# Patient Record
Sex: FEMALE | Race: ASIAN | NOT HISPANIC OR LATINO | URBAN - METROPOLITAN AREA
[De-identification: names, ages, dates, MRNs, and addresses within clinical notes are randomized per-mention and may not be internally consistent; named-entity substitution may affect disease eponyms.]

---

## 2023-02-26 ENCOUNTER — EMERGENCY (EMERGENCY)
Facility: HOSPITAL | Age: 33
LOS: 1 days | Discharge: HOME | End: 2023-02-26
Attending: EMERGENCY MEDICINE | Admitting: EMERGENCY MEDICINE
Payer: MEDICAID

## 2023-02-26 VITALS
TEMPERATURE: 98 F | SYSTOLIC BLOOD PRESSURE: 161 MMHG | HEART RATE: 90 BPM | DIASTOLIC BLOOD PRESSURE: 103 MMHG | RESPIRATION RATE: 18 BRPM | OXYGEN SATURATION: 98 %

## 2023-02-26 LAB
ALBUMIN SERPL ELPH-MCNC: 4.6 G/DL — SIGNIFICANT CHANGE UP (ref 3.3–5)
ALP SERPL-CCNC: 78 U/L — SIGNIFICANT CHANGE UP (ref 40–120)
ALT FLD-CCNC: 16 U/L — SIGNIFICANT CHANGE UP (ref 4–33)
ANION GAP SERPL CALC-SCNC: 13 MMOL/L — SIGNIFICANT CHANGE UP (ref 7–14)
APPEARANCE UR: ABNORMAL
AST SERPL-CCNC: 12 U/L — SIGNIFICANT CHANGE UP (ref 4–32)
BASOPHILS # BLD AUTO: 0.03 K/UL — SIGNIFICANT CHANGE UP (ref 0–0.2)
BASOPHILS NFR BLD AUTO: 0.4 % — SIGNIFICANT CHANGE UP (ref 0–2)
BILIRUB SERPL-MCNC: 0.4 MG/DL — SIGNIFICANT CHANGE UP (ref 0.2–1.2)
BILIRUB UR-MCNC: NEGATIVE — SIGNIFICANT CHANGE UP
BUN SERPL-MCNC: 6 MG/DL — LOW (ref 7–23)
CALCIUM SERPL-MCNC: 9.8 MG/DL — SIGNIFICANT CHANGE UP (ref 8.4–10.5)
CHLORIDE SERPL-SCNC: 103 MMOL/L — SIGNIFICANT CHANGE UP (ref 98–107)
CO2 SERPL-SCNC: 23 MMOL/L — SIGNIFICANT CHANGE UP (ref 22–31)
COLOR SPEC: COLORLESS — SIGNIFICANT CHANGE UP
CREAT SERPL-MCNC: 0.43 MG/DL — LOW (ref 0.5–1.3)
D DIMER BLD IA.RAPID-MCNC: <150 NG/ML DDU — SIGNIFICANT CHANGE UP
DIFF PNL FLD: ABNORMAL
EGFR: 133 ML/MIN/1.73M2 — SIGNIFICANT CHANGE UP
EOSINOPHIL # BLD AUTO: 0.03 K/UL — SIGNIFICANT CHANGE UP (ref 0–0.5)
EOSINOPHIL NFR BLD AUTO: 0.4 % — SIGNIFICANT CHANGE UP (ref 0–6)
FLUAV AG NPH QL: SIGNIFICANT CHANGE UP
FLUBV AG NPH QL: SIGNIFICANT CHANGE UP
GLUCOSE SERPL-MCNC: 130 MG/DL — HIGH (ref 70–99)
GLUCOSE UR QL: NEGATIVE — SIGNIFICANT CHANGE UP
HCT VFR BLD CALC: 36.1 % — SIGNIFICANT CHANGE UP (ref 34.5–45)
HGB BLD-MCNC: 11.4 G/DL — LOW (ref 11.5–15.5)
IANC: 5.8 K/UL — SIGNIFICANT CHANGE UP (ref 1.8–7.4)
IMM GRANULOCYTES NFR BLD AUTO: 0.4 % — SIGNIFICANT CHANGE UP (ref 0–0.9)
KETONES UR-MCNC: NEGATIVE — SIGNIFICANT CHANGE UP
LEUKOCYTE ESTERASE UR-ACNC: NEGATIVE — SIGNIFICANT CHANGE UP
LIDOCAIN IGE QN: 15 U/L — SIGNIFICANT CHANGE UP (ref 7–60)
LYMPHOCYTES # BLD AUTO: 2.14 K/UL — SIGNIFICANT CHANGE UP (ref 1–3.3)
LYMPHOCYTES # BLD AUTO: 25.4 % — SIGNIFICANT CHANGE UP (ref 13–44)
MAGNESIUM SERPL-MCNC: 1.7 MG/DL — SIGNIFICANT CHANGE UP (ref 1.6–2.6)
MCHC RBC-ENTMCNC: 25.7 PG — LOW (ref 27–34)
MCHC RBC-ENTMCNC: 31.6 GM/DL — LOW (ref 32–36)
MCV RBC AUTO: 81.3 FL — SIGNIFICANT CHANGE UP (ref 80–100)
MONOCYTES # BLD AUTO: 0.38 K/UL — SIGNIFICANT CHANGE UP (ref 0–0.9)
MONOCYTES NFR BLD AUTO: 4.5 % — SIGNIFICANT CHANGE UP (ref 2–14)
NEUTROPHILS # BLD AUTO: 5.8 K/UL — SIGNIFICANT CHANGE UP (ref 1.8–7.4)
NEUTROPHILS NFR BLD AUTO: 68.9 % — SIGNIFICANT CHANGE UP (ref 43–77)
NITRITE UR-MCNC: NEGATIVE — SIGNIFICANT CHANGE UP
NRBC # BLD: 0 /100 WBCS — SIGNIFICANT CHANGE UP (ref 0–0)
NRBC # FLD: 0 K/UL — SIGNIFICANT CHANGE UP (ref 0–0)
NT-PROBNP SERPL-SCNC: <5 PG/ML — SIGNIFICANT CHANGE UP
PH UR: 6.5 — SIGNIFICANT CHANGE UP (ref 5–8)
PLATELET # BLD AUTO: 401 K/UL — HIGH (ref 150–400)
POTASSIUM SERPL-MCNC: 3.9 MMOL/L — SIGNIFICANT CHANGE UP (ref 3.5–5.3)
POTASSIUM SERPL-SCNC: 3.9 MMOL/L — SIGNIFICANT CHANGE UP (ref 3.5–5.3)
PROT SERPL-MCNC: 8.2 G/DL — SIGNIFICANT CHANGE UP (ref 6–8.3)
PROT UR-MCNC: ABNORMAL
RBC # BLD: 4.44 M/UL — SIGNIFICANT CHANGE UP (ref 3.8–5.2)
RBC # FLD: 13.3 % — SIGNIFICANT CHANGE UP (ref 10.3–14.5)
RBC CASTS # UR COMP ASSIST: SIGNIFICANT CHANGE UP /HPF (ref 0–4)
RSV RNA NPH QL NAA+NON-PROBE: SIGNIFICANT CHANGE UP
SARS-COV-2 RNA SPEC QL NAA+PROBE: SIGNIFICANT CHANGE UP
SODIUM SERPL-SCNC: 139 MMOL/L — SIGNIFICANT CHANGE UP (ref 135–145)
SP GR SPEC: 1 — LOW (ref 1.01–1.05)
TROPONIN T, HIGH SENSITIVITY RESULT: <6 NG/L — SIGNIFICANT CHANGE UP
TSH SERPL-MCNC: 1.28 UIU/ML — SIGNIFICANT CHANGE UP (ref 0.27–4.2)
UROBILINOGEN FLD QL: SIGNIFICANT CHANGE UP
WBC # BLD: 8.41 K/UL — SIGNIFICANT CHANGE UP (ref 3.8–10.5)
WBC # FLD AUTO: 8.41 K/UL — SIGNIFICANT CHANGE UP (ref 3.8–10.5)
WBC UR QL: 2 /HPF — SIGNIFICANT CHANGE UP (ref 0–5)

## 2023-02-26 PROCEDURE — 71046 X-RAY EXAM CHEST 2 VIEWS: CPT | Mod: 26

## 2023-02-26 PROCEDURE — 99223 1ST HOSP IP/OBS HIGH 75: CPT

## 2023-02-26 RX ORDER — ASPIRIN/CALCIUM CARB/MAGNESIUM 324 MG
324 TABLET ORAL ONCE
Refills: 0 | Status: COMPLETED | OUTPATIENT
Start: 2023-02-26 | End: 2023-02-26

## 2023-02-26 RX ORDER — LOSARTAN POTASSIUM 100 MG/1
50 TABLET, FILM COATED ORAL DAILY
Refills: 0 | Status: DISCONTINUED | OUTPATIENT
Start: 2023-02-26 | End: 2023-03-01

## 2023-02-26 RX ORDER — FAMOTIDINE 10 MG/ML
20 INJECTION INTRAVENOUS ONCE
Refills: 0 | Status: COMPLETED | OUTPATIENT
Start: 2023-02-26 | End: 2023-02-26

## 2023-02-26 RX ORDER — SIMETHICONE 80 MG/1
80 TABLET, CHEWABLE ORAL ONCE
Refills: 0 | Status: COMPLETED | OUTPATIENT
Start: 2023-02-26 | End: 2023-02-26

## 2023-02-26 RX ORDER — ACETAMINOPHEN 500 MG
975 TABLET ORAL ONCE
Refills: 0 | Status: COMPLETED | OUTPATIENT
Start: 2023-02-26 | End: 2023-02-26

## 2023-02-26 RX ADMIN — FAMOTIDINE 20 MILLIGRAM(S): 10 INJECTION INTRAVENOUS at 12:28

## 2023-02-26 RX ADMIN — Medication 30 MILLILITER(S): at 12:28

## 2023-02-26 RX ADMIN — Medication 975 MILLIGRAM(S): at 22:07

## 2023-02-26 RX ADMIN — Medication 975 MILLIGRAM(S): at 22:37

## 2023-02-26 RX ADMIN — SIMETHICONE 80 MILLIGRAM(S): 80 TABLET, CHEWABLE ORAL at 12:29

## 2023-02-26 RX ADMIN — Medication 324 MILLIGRAM(S): at 14:46

## 2023-02-26 NOTE — ED PROVIDER NOTE - PROGRESS NOTE DETAILS
Discussed results with patient in detail. No actionable findings. I gave patient a copy of the results. Patient reports she continues to have chest discomfort. Discussed need for outpatient follow up with GI and cardiology.  I discussed staying for stress/ echo and patient is agreeable to plan as she continues to have symptoms and is concerned. Discussed with CDU AUDREY Singleton who accepted patient.

## 2023-02-26 NOTE — ED ADULT NURSE REASSESSMENT NOTE - NSIMPLEMENTINTERV_GEN_ALL_ED
Implemented All Universal Safety Interventions:  [place] to call system. Call bell, personal items and telephone within reach. Instruct patient to call for assistance. Room bathroom lighting operational. Non-slip footwear when patient is off stretcher. Physically safe environment: no spills, clutter or unnecessary equipment. Stretcher in lowest position, wheels locked, appropriate side rails in place.

## 2023-02-26 NOTE — ED ADULT NURSE NOTE - OBJECTIVE STATEMENT
p/t is a 32y old female received awake and responsive, c/o of epigastric pain for past few days, p/t denies any chest pain or sob, labs drawn and sent as per MD order

## 2023-02-26 NOTE — ED ADULT NURSE NOTE - NSIMPLEMENTINTERV_GEN_ALL_ED
Implemented All Universal Safety Interventions:  [place] to call system. Call bell, personal items and telephone within reach. Instruct patient to call for assistance. Room bathroom lighting operational. Non-slip footwear when patient is off stretcher. Physically safe environment: no spills, clutter or unnecessary equipment. Stretcher in lowest position, wheels locked, appropriate side rails in place.

## 2023-02-26 NOTE — ED ADULT NURSE REASSESSMENT NOTE - NS ED NURSE REASSESS COMMENT FT1
Break RN: pt received to CDU from RW, a&ox4, c/o intermittent cp. Pt denies sob n/v, headache, dizziness. Breathing even, unlabored. Pt medicated as per MAR.
Received patient to results waiting. Patient accepted to CDU for stress test when available. Pt is alert and oriented times four and is comfortable in area. report given and patient transferred over. FRANCHESKA Cummins

## 2023-02-26 NOTE — ED PROVIDER NOTE - OBJECTIVE STATEMENT
Piter HIGH: Patient is a 32-year-old female with a history of diabetes mellitus, on 1000 mg metformin twice a day, hypertension, on losartan 50 mg daily (recently changed last week from 25 mg to 50 mg by PCP),  here for evaluation of left-sided sharp intermittent chest pain since this morning.  Patient reports pain comes and goes.  She states that she has been having increased burping over the past few weeks and has been on famotidine.  She states that she ran out of her famotidine yesterday and took omeprazole.  She reports that her burping has increased since then.  She reports she sometimes has chest pain with exertion.  She denies any leg swelling or calf pain.  She denies any travel.  She denies any fever, nausea, vomiting, cough.  Patient also complains of pain in her left side that has been intermittently present for the past 3 weeks.  She denies any urinary symptoms such as dysuria.  She denies any black or bloody stools.  She reports yesterday, she had small bowel movements that were loose in nature.  Patient denies any prior cardiac work-up.  She states that she is very anxious about her high blood pressure.  Patient is not on any OCP medication.

## 2023-02-26 NOTE — ED ADULT TRIAGE NOTE - CHIEF COMPLAINT QUOTE
Pt AOX4c/o chest pain, intermittent x a few days, unrelated to activity; pain got more frequent today, denies any cough or congestion; Hx of DM type 2 fingerstick glucose in triage 129;   Hx of recent dx of HTN started on Losartan x 3 weeks ago; also takes omeprazole for acid stomach,  denies SOB

## 2023-02-26 NOTE — ED PROVIDER NOTE - CLINICAL SUMMARY MEDICAL DECISION MAKING FREE TEXT BOX
Piter HIGH:  32-year-old female with history of diabetes, hypertension here for sharp intermittent chest pain since this morning.  EKG reviewed and has no ST elevations or ST depressions.  However, given risk factors, including family history of heart disease, will check labs including troponin.  Discussed outpatient cardiology follow-up versus CDU for echo stress.  Patient and her  will think about it.  Patient appears to be very anxious and stressed.  She states that she generally does not feel well.  She reports that she is concerned about her high blood pressure.  Patient has a primary care physician with whom she is following.  She just had her medication of losartan increased from 25 mg to 50 mg.  She was told to follow-up by her primary care physician within a few weeks.  I discussed need for follow-up and management of blood pressure in detail with patient and her .  At this time, plan is to assess for ACS and any evidence of renal dysfunction.  Patient is low risk for a PE so will check a D-dimer. I suspect that patient may have pain related to gas, given her persistent burping over the past few weeks.  However, she has risk factors for cardiac disease, so should follow-up with a cardiologist at minimum.  Patient reports that she has arranged for GI follow-up in a few weeks.

## 2023-02-26 NOTE — ED CDU PROVIDER INITIAL DAY NOTE - OBJECTIVE STATEMENT
Patient is a 32-year-old female with a history of diabetes mellitus (on metformin), hypertension who presents to ED c/o left-sided sharp intermittent chest pain since this morning.  Patient reports pain comes and goes.  She states that she has been having increased burping over the past few weeks and has been on famotidine.  Also notes her PCP prescribed her omeprazole which she tried taking one dose with little relief. Symptoms are exacerbated with eating and drinking. Pt denies fevers, chills, n/v/d, abd pain, dysuria, hematuria, pleuritic pain, recent travel, LE swelling or calf pain or any other complaints.  She denies any fever, nausea, vomiting, cough.

## 2023-02-26 NOTE — ED CDU PROVIDER INITIAL DAY NOTE - CLINICAL SUMMARY MEDICAL DECISION MAKING FREE TEXT BOX
Patient is a 32-year-old female with a history of diabetes mellitus (on metformin), hypertension who presents to ED c/o left-sided sharp intermittent chest pain since this morning.  She states that she has been having increased burping over the past few weeks as well. Pt seen and worked up in ED; ECG wnl, CXR normal, Labs wnl, Trop neg. Dimer negative. Pt transferred to CDU for tele, stress and echo.

## 2023-02-26 NOTE — ED CDU PROVIDER INITIAL DAY NOTE - ATTENDING APP SHARED VISIT CONTRIBUTION OF CARE
Patient is a 32-year-old female with a history of diabetes mellitus, on 1000 mg metformin twice a day, hypertension, on losartan 50 mg daily (recently changed last week from 25 mg to 50 mg by PCP),  here for evaluation of left-sided sharp intermittent chest pain since this morning.  Patient reports pain comes and goes.  She states that she has been having increased burping over the past few weeks and has been on famotidine.  She states that she ran out of her famotidine yesterday and took omeprazole.  She reports that her burping has increased since then.  She reports she sometimes has chest pain with exertion.  She denies any leg swelling or calf pain.  She denies any travel.  She denies any fever, nausea, vomiting, cough.  Patient also complains of pain in her left side that has been intermittently present for the past 3 weeks.  She denies any urinary symptoms such as dysuria.  She denies any black or bloody stools.  She reports yesterday, she had small bowel movements that were loose in nature.  Patient denies any prior cardiac work-up.  She states that she is very anxious about her high blood pressure.  Patient is not on any OCP medication.    VS noted  Gen. no acute distress, Non toxic   HEENT: EOMI, mmm  Lungs: CTAB/L no C/ W /R   CVS: RRR   Abd; Soft non tender, non distended   Ext: no edema  Skin: no rash  Neuro AAOx3 non focal clear speech  a/p: chest pain - d-dimer, trop wnl. Patient continues to have pain. EKG reviewed and has no ST elevations or ST depressions. Plan for CDU for tele monitoring and stress/ echo.   - Piter HIGH

## 2023-02-26 NOTE — ED CDU PROVIDER INITIAL DAY NOTE - PHYSICAL EXAMINATION
Vital signs reviewed.   CONSTITUTIONAL: Well-appearing; well-nourished; in no apparent distress. Non-toxic appearing.   HEAD: Normocephalic, atraumatic.  EYES: PERRL, EOM intact, conjunctiva and sclera WNL.  ENT: normal nose; no rhinorrhea  CARD: Normal S1, S2; no murmurs  RESP: Normal chest excursion with respiration; breath sounds clear and equal bilaterally; no wheezes, rhonchi, or rales.  ABD/GI: soft, non-distended; non-tender;   EXT/MS: moves all extremities; distal pulses are normal, no pedal edema.  SKIN: Normal for age and race; warm; dry; good turgor; no apparent lesions or exudate noted.  NEURO: Awake, alert, oriented x 3, no gross deficits,  PSYCH: Normal mood; appropriate affect.

## 2023-02-27 VITALS — SYSTOLIC BLOOD PRESSURE: 123 MMHG | HEART RATE: 82 BPM | DIASTOLIC BLOOD PRESSURE: 88 MMHG

## 2023-02-27 PROCEDURE — 73564 X-RAY EXAM KNEE 4 OR MORE: CPT | Mod: 26,LT

## 2023-02-27 PROCEDURE — 93016 CV STRESS TEST SUPVJ ONLY: CPT | Mod: GC

## 2023-02-27 PROCEDURE — 99238 HOSP IP/OBS DSCHRG MGMT 30/<: CPT

## 2023-02-27 PROCEDURE — 93306 TTE W/DOPPLER COMPLETE: CPT | Mod: 26

## 2023-02-27 PROCEDURE — 93018 CV STRESS TEST I&R ONLY: CPT | Mod: GC

## 2023-02-27 RX ORDER — FAMOTIDINE 10 MG/ML
20 INJECTION INTRAVENOUS ONCE
Refills: 0 | Status: COMPLETED | OUTPATIENT
Start: 2023-02-27 | End: 2023-02-27

## 2023-02-27 RX ADMIN — LOSARTAN POTASSIUM 50 MILLIGRAM(S): 100 TABLET, FILM COATED ORAL at 05:38

## 2023-02-27 RX ADMIN — FAMOTIDINE 20 MILLIGRAM(S): 10 INJECTION INTRAVENOUS at 08:58

## 2023-02-27 NOTE — ED CDU PROVIDER SUBSEQUENT DAY NOTE - ATTENDING APP SHARED VISIT CONTRIBUTION OF CARE
CDU MD NGUYEN:  I performed a face to face bedside interview with patient regarding history of present illness, review of symptoms and past medical history. I completed an independent physical exam.  I have discussed patient's plan of care with PA.   I agree with note as stated above, having amended the EMR as needed to reflect my findings. I have discussed the assessment and plan of care.  This includes during the time I functioned as the attending physician for this patient.

## 2023-02-27 NOTE — ED CDU PROVIDER DISPOSITION NOTE - NSFOLLOWUPINSTRUCTIONS_ED_ALL_ED_FT
Follow up with your Doctor in 1-2 days.    Follow up with cardiology Dr Nguyen in 1-2 days.    Return to the ER for any persistent/worsening or new symptoms, chest pain, shortness of breath, palpitations, dizziness or any concerning symptoms.        Chest Pain    WHAT YOU NEED TO KNOW:    Chest pain can be caused by a range of conditions, from not serious to life-threatening. Chest pain can be a symptom of a digestive problem, such as acid reflux or a stomach ulcer. An anxiety attack or a strong emotion, such as anger, can also cause chest pain. Infection, inflammation, or a fracture in the bones or cartilage in your chest can cause pain or discomfort. Sometimes chest pain or pressure is caused by poor blood flow to your heart (angina). Chest pain may also be caused by life-threatening conditions such as a heart attack or blood clot in your lungs.    DISCHARGE INSTRUCTIONS:    Call your local emergency number (911 in the ) or have someone call if:   •You have any of the following signs of a heart attack: ?Squeezing, pressure, or pain in your chest      ?You may also have any of the following: ?Discomfort or pain in your back, neck, jaw, stomach, or arm      ?Shortness of breath      ?Nausea or vomiting      ?Lightheadedness or a sudden cold sweat            Return to the emergency department if:   •You have chest discomfort that gets worse, even with medicine.      •You cough or vomit blood.      •Your bowel movements are black or bloody.      •You cannot stop vomiting, or it hurts to swallow.      Call your doctor if:   •You have questions or concerns about your condition or care.          Medicines:   •Medicines may be given to treat the cause of your chest pain. Examples include pain medicine, anxiety medicine, or medicines to increase blood flow to your heart.      •Do not take certain medicines without asking your healthcare provider first. These include NSAIDs, herbal or vitamin supplements, and hormones, such as estrogen or progestin.      •Take your medicine as directed. Contact your healthcare provider if you think your medicine is not helping or if you have side effects. Tell your provider if you are allergic to any medicine. Keep a list of the medicines, vitamins, and herbs you take. Include the amounts, and when and why you take them. Bring the list or the pill bottles to follow-up visits. Carry your medicine list with you in case of an emergency.      Healthy living tips: If the cause of your chest pain is known, your healthcare provider will give you specific guidelines to follow. The following are general healthy guidelines:  •Do not smoke. Nicotine and other chemicals in cigarettes and cigars can cause lung and heart damage. Ask your healthcare provider for information if you currently smoke and need help to quit. E-cigarettes or smokeless tobacco still contain nicotine. Talk to your healthcare provider before you use these products.      •Choose a variety of healthy foods as often as possible. Include fresh, frozen, or canned fruits and vegetables. Also include low-fat dairy products, fish, chicken (without skin), and lean meats. Your healthcare provider or a dietitian can help you create meal plans. You may need to avoid certain foods or drinks if your pain is caused by a digestion problem.  Healthy Foods           •Lower your sodium (salt) intake. Limit foods that are high in sodium, such as canned foods, salty snacks, and cold cuts. If you add salt when you cook food, do not add more at the table. Choose low-sodium canned foods as much as possible.             •Drink plenty of water every day. Water helps your body to control your temperature and blood pressure. Ask your healthcare provider how much water you should drink every day.      •Ask about activity. Your healthcare provider will tell you which activities to limit or avoid. Ask when you can drive, return to work, and have sex. Ask about the best exercise plan for you.      •Maintain a healthy weight. Ask your healthcare provider what a healthy weight is for you. Ask him or her to help you create a safe weight loss plan if you are overweight.      •Ask about vaccines you may need. Your healthcare provider can tell you which vaccines you need, and when to get them. The following vaccines help prevent diseases that can become serious for a person with a heart condition:?The influenza (flu) vaccine is given each year. Get a flu vaccine as soon as recommended, usually in September or October.      ?The pneumonia vaccine is usually given every 5 years. Your healthcare provider may recommend the pneumonia vaccine if you are 65 or older.      ?COVID-19 vaccines are given to adults as a shot in 1 or 2 doses. Vaccination is recommended for all adults. A booster (additional) dose is also recommended for all adults. A second booster is recommended for all adults 50 or older and for immunocompromised adults 18 or older. The second booster is also recommended for adults who received the 1-dose vaccine for the first and booster doses. Your healthcare provider can tell you when to get one or both boosters.      Prevent Heart Disease          Follow up with your doctor within 72 hours, or as directed: You may need to return for more tests to find the cause of your chest pain. You may be referred to a specialist, such as a cardiologist or gastroenterologist. Write down your questions so you remember to ask them during your visits.       © Copyright [employer] 2023

## 2023-02-27 NOTE — ED CDU PROVIDER SUBSEQUENT DAY NOTE - CLINICAL SUMMARY MEDICAL DECISION MAKING FREE TEXT BOX
Patient is a 32-year-old female with a history of diabetes mellitus (on metformin), hypertension who presents to ED c/o left-sided sharp intermittent chest pain since this morning.  She states that she has been having increased burping over the past few weeks as well. Pt seen and worked up in ED; ECG wnl, CXR normal, Labs wnl, Trop neg. Dimer negative. Pt transferred to CDU for tele, further cardiac testing

## 2023-02-27 NOTE — ED CDU PROVIDER DISPOSITION NOTE - CLINICAL COURSE
AUDREY Jeff: 33 y/o female with a hx of DM, HTN presents to the ER c/o chest pain.  Pt placed in CDU for cardiac monitoring, echo, stress test and Tele Doc.  Pt seen and cleared by cardiology Dr Nguyen results reviewed.  Pt feels better ambulating without difficulty.  Results reviewed with patient.  Discharge reviewed and discussed with patient.

## 2023-02-27 NOTE — ED CDU PROVIDER DISPOSITION NOTE - CARE PROVIDER_API CALL
Cecilio Nguyen (DO)  Cardiology  [address] [place] [address]  Phone: [phone]  Follow Up Time:

## 2023-02-27 NOTE — ED CDU PROVIDER SUBSEQUENT DAY NOTE - NSICDXFAMILYHX_GEN_ALL_CORE_FT
FAMILY HISTORY:  Father  Still living? Unknown  Family history of diabetes mellitus (DM), Age at diagnosis: Age Unknown  FH: CAD (coronary artery disease), Age at diagnosis: Age Unknown    Mother  Still living? Unknown  Family history of diabetes mellitus (DM), Age at diagnosis: Age Unknown  FH: CAD (coronary artery disease), Age at diagnosis: Age Unknown

## 2023-02-27 NOTE — ED CDU PROVIDER SUBSEQUENT DAY NOTE - HISTORY
32-year-old female with a history of diabetes mellitus (on metformin), hypertension who presents to ED c/o left-sided sharp intermittent chest pain since this morning.  She states that she has been having increased burping over the past few weeks as well. Pt seen and worked up in ED; ECG wnl, CXR normal, Labs wnl, Trop neg. Dimer negative. Pt transferred to CDU for tele, further cardiac testing.    No events overnight. Pending cardiac testing this morning. VSS.

## 2023-02-27 NOTE — ED CDU PROVIDER SUBSEQUENT DAY NOTE - PROGRESS NOTE DETAILS
Pt states int cp, but she is c/o l knee pain dizziness and luq discomfort.  Has had these sx in the past but worse today.  Obtain xr knee.  Pending stress, cards recs, will give pain med reassess. AUDREY Jeff: 31 y/o female with a hx of DM, HTN presents to the ER c/o chest pain.  Pt placed in CDU for cardiac monitoring, echo, stress test and Tele Doc.  Pt seen and cleared by cardiology Dr Nguyen results reviewed.  Pt feels better ambulating without difficulty.  Results reviewed with patient.  Discharge reviewed and discussed with patient.

## 2023-02-27 NOTE — ED CDU PROVIDER DISPOSITION NOTE - PATIENT PORTAL LINK FT
You can access the FollowMyHealth Patient Portal offered by Health system by registering at the following website: [email or web address]. By joining [employer]’s FollowMyHealth portal, you will also be able to view your health information using other applications (apps) compatible with our system.

## 2023-02-27 NOTE — CONSULT NOTE ADULT - SUBJECTIVE AND OBJECTIVE BOX
Cardiovascular Disease Initial Evaluation  Date of service: 02-27-23 @ 10:04    CHIEF COMPLAINT: Chest pain    HISTORY OF PRESENT ILLNESS:  32 F with HTN,  DM (diagnosed 10 years ago), family history of early onset CAD who presents with chest pain. Pt states for the past several months she has been experiencing Left sided flank pain. Pt went to her PCP and had testing done but nothing was found. Her symptoms did not subside and progressively got worse to the point she began to experience chest pain and numbness on her R side. She check her BP and noticed it to be high. Pt decided to come to the ED for further evaluation. Pt admits to having constipation. She has never been seen by A GI specialist. She denies cardiac history but admits that her father and mother both had CAD requiring revascularization in their 50s.          Allergies    No Known Allergies    Intolerances    	    MEDICATIONS:  losartan 50 milliGRAM(s) Oral daily                  PAST MEDICAL & SURGICAL HISTORY:  DM (diabetes mellitus)      Hypertension          FAMILY HISTORY:      SOCIAL HISTORY:    The patient is a nonsmoker       REVIEW OF SYSTEMS:  See HPI, otherwise complete 14 point review of systems negative    [x ] All others negative	  [ ] Unable to obtain    PHYSICAL EXAM:  T(C): 36.2 (02-27-23 @ 06:00), Max: 36.7 (02-26-23 @ 10:30)  HR: 71 (02-27-23 @ 06:00) (71 - 90)  BP: 146/88 (02-27-23 @ 06:00) (135/96 - 161/103)  RR: 18 (02-27-23 @ 06:00) (15 - 18)  SpO2: 98% (02-27-23 @ 06:00) (98% - 100%)  Wt(kg): --  I&O's Summary      Appearance: No Acute Distress; resting comfortably  HEENT:  Normal oral mucosa, PERRL, EOMI	  Cardiovascular: Normal S1 S2, No JVD, No murmurs/rubs/gallops  Respiratory: Normal respiratory effort; Lungs clear to auscultation bilaterally  Gastrointestinal:  Soft, Non-tender, + BS	  Skin: No rashes, No ecchymoses, No cyanosis	  Neurologic: Non-focal; no weakness  Extremities: No clubbing, cyanosis or edema  Vascular: Peripheral pulses palpable 2+ bilaterally  Psychiatry: A & O x 3, Mood & affect appropriate    Laboratory Data:	 	    CBC Full  -  ( 26 Feb 2023 12:20 )  WBC Count : 8.41 K/uL  Hemoglobin : 11.4 g/dL  Hematocrit : 36.1 %  Platelet Count - Automated : 401 K/uL  Mean Cell Volume : 81.3 fL  Mean Cell Hemoglobin : 25.7 pg  Mean Cell Hemoglobin Concentration : 31.6 gm/dL  Auto Neutrophil # : 5.80 K/uL  Auto Lymphocyte # : 2.14 K/uL  Auto Monocyte # : 0.38 K/uL  Auto Eosinophil # : 0.03 K/uL  Auto Basophil # : 0.03 K/uL  Auto Neutrophil % : 68.9 %  Auto Lymphocyte % : 25.4 %  Auto Monocyte % : 4.5 %  Auto Eosinophil % : 0.4 %  Auto Basophil % : 0.4 %    02-26    139  |  103  |  6<L>  ----------------------------<  130<H>  3.9   |  23  |  0.43<L>    Ca    9.8      26 Feb 2023 12:20  Mg     1.70     02-26    TPro  8.2  /  Alb  4.6  /  TBili  0.4  /  DBili  x   /  AST  12  /  ALT  16  /  AlkPhos  78  02-26      proBNP: Serum Pro-Brain Natriuretic Peptide: <5 pg/mL (02-26 @ 12:20)    Lipid Profile:   HgA1c:   TSH: Thyroid Stimulating Hormone, Serum: 1.28 uIU/mL (02-26 @ 12:20)        CARDIAC MARKERS: Trop <6            Interpretation of Telemetry: Sinus    ECG: Sinus rhythm  RADIOLOGY:  OTHER: 	    PREVIOUS DIAGNOSTIC TESTING:    [x ] Echocardiogram: 2/27/2023  1. Normal mitral valve. Minimal mitral regurgitation.  2. Normal left ventricular internal dimensions and wall  thicknesses.  3. Normal left ventricular systolic function. No segmental  wall motion abnormalities.  4. Normal left ventricular diastolic function.  5. The right ventricle is not well visualized; grossly  normal right ventricular systolic function.  [ ] Catheterization:  [ ] Stress Test:  	    Assessment:  32 F with HTN,  DM (diagnosed 10 years ago), family history of early onset CAD who presents with chest pain    Plan of Care:    #Atypical chest pain  - ACS ruled out  - EKG 2/27/2023 shows sinus rhythm with no acute ischemic changes  - TTE from 2/27/2023 shows normal LV systolic function with trace MR.   - Stress test pending.  - Pt would benefit from GI workup, possible gastroparesis symptoms    #HTN  - BP elevated  - Continue Losartan 50mg   - Recommend adding Amlodipine 5mg daily  - Pt has associated numbness on her R side with elevated BP  - Recommend CT head for further evluation    #DM  - ISS  - Pt take metformin    #ACP (advance care planning)-  Advanced care planning was discussed with the patient.  Risks, benefits and alternatives of medical treatment and procedures were discussed in detail and all questions were answered. 30 additional minutes spent addressing advance care plans.    72 minutes spent on total encounter; more than 50% of the visit was spent counseling and/or coordinating care by the attending physician.   	  Cecilio Nguyen,  [facility]  Cardiovascular Diseases  [phone]

## 2023-02-28 ENCOUNTER — NON-APPOINTMENT (OUTPATIENT)
Age: 33
End: 2023-02-28

## 2023-02-28 PROBLEM — E11.9 TYPE 2 DIABETES MELLITUS WITHOUT COMPLICATIONS: Chronic | Status: ACTIVE | Noted: 2023-02-26

## 2023-02-28 PROBLEM — I10 ESSENTIAL (PRIMARY) HYPERTENSION: Chronic | Status: ACTIVE | Noted: 2023-02-26

## 2023-03-01 ENCOUNTER — APPOINTMENT (OUTPATIENT)
Dept: GASTROENTEROLOGY | Facility: CLINIC | Age: 33
End: 2023-03-01
Payer: MEDICAID

## 2023-03-01 VITALS
OXYGEN SATURATION: 99 % | HEIGHT: 61 IN | HEART RATE: 70 BPM | DIASTOLIC BLOOD PRESSURE: 70 MMHG | WEIGHT: 148 LBS | BODY MASS INDEX: 27.94 KG/M2 | SYSTOLIC BLOOD PRESSURE: 110 MMHG

## 2023-03-01 DIAGNOSIS — Z86.79 PERSONAL HISTORY OF OTHER DISEASES OF THE CIRCULATORY SYSTEM: ICD-10-CM

## 2023-03-01 DIAGNOSIS — Z86.39 PERSONAL HISTORY OF OTHER ENDOCRINE, NUTRITIONAL AND METABOLIC DISEASE: ICD-10-CM

## 2023-03-01 DIAGNOSIS — R14.2 ERUCTATION: ICD-10-CM

## 2023-03-01 DIAGNOSIS — Z78.9 OTHER SPECIFIED HEALTH STATUS: ICD-10-CM

## 2023-03-01 DIAGNOSIS — R10.9 UNSPECIFIED ABDOMINAL PAIN: ICD-10-CM

## 2023-03-01 DIAGNOSIS — K59.00 CONSTIPATION, UNSPECIFIED: ICD-10-CM

## 2023-03-01 PROBLEM — Z00.00 ENCOUNTER FOR PREVENTIVE HEALTH EXAMINATION: Status: ACTIVE | Noted: 2023-03-01

## 2023-03-01 PROCEDURE — 99204 OFFICE O/P NEW MOD 45 MIN: CPT

## 2023-03-01 RX ORDER — POLYETHYLENE GLYCOL 3350 17 G/17G
17 POWDER, FOR SOLUTION ORAL DAILY
Qty: 30 | Refills: 3 | Status: ACTIVE | COMMUNITY
Start: 2023-03-01 | End: 1900-01-01

## 2023-03-01 RX ORDER — LOSARTAN POTASSIUM 100 MG/1
TABLET, FILM COATED ORAL
Refills: 0 | Status: ACTIVE | COMMUNITY

## 2023-03-01 RX ORDER — METFORMIN HYDROCHLORIDE 850 MG/1
850 TABLET, COATED ORAL
Refills: 0 | Status: ACTIVE | COMMUNITY

## 2023-03-01 NOTE — ASSESSMENT
[FreeTextEntry1] : 1. Diffuse abdominal pain, with belching ,constipation, atypical chest pain, s/p stress test and echo last month at [facility] normal. recommend egd/colonoscopy to r/o colon etiology, colitis etc and egd r/o gastritits etc .Also with component of musculoskeletal  pain. seen by neurology given muscle relaxant\par \par Discussed risks including but not limited to bleeding,infection,drug reaction, perforation,missed lesion,benefits and alternatives of colonoscopy/egd with patient  including no\par treatment and patient consents to procedure.\par \par plan egd/colonoscopy to be scheduled after medical clearance\par           ppi daily 40mg \par         pepcid 40mg qhs\par            miralax daily\par         antireflux diet , no dairy

## 2023-03-01 NOTE — HISTORY OF PRESENT ILLNESS
[FreeTextEntry1] : 33 yo female was on Ozempic for one year, h/o DM, h/o HTN, increase cholesterol. patient reports ozempic increased in 11/2022, and started having belching  and Left llq pain. patient reports atypical chest pain went to [facility]\par s/p stress test and echo normal. Patient reports started famotidine 20mg bid for 3 months with no improvement of symptoms. irregular bms, c/o hemorrhoids. 3bms brown hard. Last bm small amount. no brbpr, no melena.\par  poor appetite.  llq pain constant daily, unrelated to bm, worse with eating.  some weight loss. h/o nausea, no vomiting. no brbpr no mlena\par \par no h/o egd/colonoscopy\par no family h/o colon or gastric cancer.\par Ct scan done 01/2023 colon diverticula,

## 2023-03-01 NOTE — PHYSICAL EXAM
[No Clubbing, Cyanosis] : no clubbing or cyanosis of the fingernails [No Focal Deficits] : no focal deficits [Oriented To Time, Place, And Person] : oriented to person, place, and time [Normal] : normal bowel sounds, non-tender, no masses, soft, no no hepato-splenomegaly [No Rectal Mass] : no rectal mass [Abnormal Walk] : normal gait [] : no rash

## 2023-03-01 NOTE — REASON FOR VISIT
[Initial Evaluation] : an initial evaluation [FreeTextEntry1] : atypical chest pain and left sided abdominal pain

## 2023-03-01 NOTE — REVIEW OF SYSTEMS
[Chest Pain] : chest pain [As Noted in HPI] : as noted in HPI [Fever] : no fever [Chills] : no chills [Red Eyes] : eyes not red [Sore Throat] : no sore throat [Palpitations] : no palpitations [SOB on Exertion] : no shortness of breath during exertion [Rash] : no rash [Anxiety] : no anxiety [Muscle Weakness] : no muscle weakness [Easy Bruising] : no tendency for easy bruising

## 2023-03-06 ENCOUNTER — APPOINTMENT (OUTPATIENT)
Dept: GASTROENTEROLOGY | Facility: CLINIC | Age: 33
End: 2023-03-06

## 2023-06-30 ENCOUNTER — APPOINTMENT (OUTPATIENT)
Dept: NEUROLOGY | Facility: CLINIC | Age: 33
End: 2023-06-30

## 2024-04-29 ENCOUNTER — APPOINTMENT (OUTPATIENT)
Dept: RHEUMATOLOGY | Facility: CLINIC | Age: 34
End: 2024-04-29
Payer: MEDICAID

## 2024-04-29 VITALS
DIASTOLIC BLOOD PRESSURE: 91 MMHG | TEMPERATURE: 98.3 F | RESPIRATION RATE: 15 BRPM | HEART RATE: 102 BPM | SYSTOLIC BLOOD PRESSURE: 131 MMHG | BODY MASS INDEX: 30.4 KG/M2 | HEIGHT: 61 IN | WEIGHT: 161 LBS | OXYGEN SATURATION: 97 %

## 2024-04-29 DIAGNOSIS — M25.50 PAIN IN UNSPECIFIED JOINT: ICD-10-CM

## 2024-04-29 DIAGNOSIS — R70.0 ELEVATED ERYTHROCYTE SEDIMENTATION RATE: ICD-10-CM

## 2024-04-29 PROCEDURE — 99204 OFFICE O/P NEW MOD 45 MIN: CPT

## 2024-04-29 RX ORDER — ASPIRIN ENTERIC COATED TABLETS 81 MG 81 MG/1
81 TABLET, DELAYED RELEASE ORAL
Refills: 0 | Status: ACTIVE | COMMUNITY
Start: 2024-04-29

## 2024-04-29 RX ORDER — CYCLOBENZAPRINE HYDROCHLORIDE 5 MG/1
5 TABLET, FILM COATED ORAL
Refills: 0 | Status: ACTIVE | COMMUNITY
Start: 2024-04-29

## 2024-04-29 RX ORDER — ATORVASTATIN CALCIUM 20 MG/1
20 TABLET, FILM COATED ORAL
Refills: 0 | Status: ACTIVE | COMMUNITY
Start: 2024-04-29

## 2024-04-29 RX ORDER — AMLODIPINE BESYLATE 5 MG/1
5 TABLET ORAL
Refills: 0 | Status: ACTIVE | COMMUNITY
Start: 2024-04-29

## 2024-04-29 NOTE — PHYSICAL EXAM
[General Appearance - Well Nourished] : well nourished [General Appearance - Well Developed] : well developed [Sclera] : the sclera and conjunctiva were normal [Auscultation Breath Sounds / Voice Sounds] : lungs were clear to auscultation bilaterally [Heart Rate And Rhythm] : heart rate was normal and rhythm regular [Heart Sounds] : normal S1 and S2 [Heart Sounds Gallop] : no gallops [Abdomen Soft] : soft [Abdomen Tenderness] : non-tender [Cervical Lymph Nodes Enlarged Posterior Bilaterally] : posterior cervical [Cervical Lymph Nodes Enlarged Anterior Bilaterally] : anterior cervical [Supraclavicular Lymph Nodes Enlarged Bilaterally] : supraclavicular [Musculoskeletal - Swelling] : no joint swelling seen [] : no rash [Skin Lesions] : no skin lesions [Oriented To Time, Place, And Person] : oriented to person, place, and time

## 2024-04-29 NOTE — ASSESSMENT
[FreeTextEntry1] : 34F w polyarthralgias =pain in L ribs, sternum, L shoulder, L knee =comes and goes =stiffness, no swelling =sometimes worse in am, sometimes worse w activities =dry mouth =labs 11/23 w high ESR  ddx RA vs PsA vs SLE vs other inflammatory arthritis vs costochondritis vs OA vs FM  will send for serologies and imaging.   Plan -Labs w serologies, inflammatory markers -Xrays L ribs, shoulders, knees  RTO depending on above results

## 2024-04-29 NOTE — CONSULT LETTER
[Dear  ___] : Dear  [unfilled], [Consult Letter:] : I had the pleasure of evaluating your patient, [unfilled]. [Consult Closing:] : Thank you very much for allowing me to participate in the care of this patient.  If you have any questions, please do not hesitate to contact me. [Sincerely,] : Sincerely, [FreeTextEntry2] : Dr. Chantal Gallegos  [address] [place] [address] [FreeTextEntry3] : Mian Hutson MD

## 2024-04-29 NOTE — HISTORY OF PRESENT ILLNESS
[FreeTextEntry1] : 34 F here for consultation  Pt reports pain which started at end 2022.  Pt reports taking ozempic, which caused nausea, and pain all over body.   Pt reports pain started in L ribs. Pt says she also developed pain in sternal area, especially when touching. Reports knee pain, L shoulder pain. Pt says pain varies. Pt says she does not feel sometimes, and sometimes it is severe.  Pt says L knee pain worse when bending. Pt says pain can be worse in L ribs.  Pt reports stiffness in shoulders.  Pt has tried tylenol for pain which sometimes helps.    Feels "bumpy" in chest.    Pt says she has had cardiac work up, which is negative.   No fevers, rashes, hair loss, oral ulcers, epistaxis, sinusitis,  swollen glands, dry eyes, CP, SOB, cough, vision changes, abdominal pain, GERD, n/v/d, blood in stool or urine, focal weakness, sensory loss,  Raynaud's,, weight loss.  +h/a which is controlled  +dry mouth  +tingling sensation in hands- pt dx by neurologist w carpal tunnel   OB: 1 miscarriage; 2 months; no hx of preeclampsia   OB:

## 2024-05-09 ENCOUNTER — NON-APPOINTMENT (OUTPATIENT)
Age: 34
End: 2024-05-09

## 2024-05-09 ENCOUNTER — APPOINTMENT (OUTPATIENT)
Dept: RHEUMATOLOGY | Facility: CLINIC | Age: 34
End: 2024-05-09
Payer: MEDICAID

## 2024-05-09 DIAGNOSIS — M06.00 RHEUMATOID ARTHRITIS W/OUT RHEUMATOID FACTOR, UNSPECIFIED SITE: ICD-10-CM

## 2024-05-09 LAB
ALBUMIN SERPL ELPH-MCNC: 4.9 G/DL
ALP BLD-CCNC: 80 U/L
ALT SERPL-CCNC: 35 U/L
ANA SER IF-ACNC: NEGATIVE
ANION GAP SERPL CALC-SCNC: 17 MMOL/L
APPEARANCE: CLEAR
AST SERPL-CCNC: 25 U/L
BACTERIA: NEGATIVE /HPF
BASOPHILS # BLD AUTO: 0.05 K/UL
BASOPHILS NFR BLD AUTO: 0.5 %
BILIRUB SERPL-MCNC: 0.2 MG/DL
BILIRUBIN URINE: NEGATIVE
BLOOD URINE: NEGATIVE
BUN SERPL-MCNC: 10 MG/DL
C3 SERPL-MCNC: 188 MG/DL
C4 SERPL-MCNC: 25 MG/DL
CALCIUM SERPL-MCNC: 9.9 MG/DL
CAST: 0 /LPF
CCP AB SER IA-ACNC: <8 UNITS
CHLORIDE SERPL-SCNC: 101 MMOL/L
CK SERPL-CCNC: 55 U/L
CO2 SERPL-SCNC: 21 MMOL/L
COLOR: YELLOW
CREAT SERPL-MCNC: 0.39 MG/DL
CREAT SPEC-SCNC: 100 MG/DL
CREAT/PROT UR: 0.2 RATIO
CRP SERPL-MCNC: 11 MG/L
DSDNA AB SER-ACNC: 1 IU/ML
EGFR: 135 ML/MIN/1.73M2
ENA RNP AB SER IA-ACNC: <0.2 AL
ENA SM AB SER IA-ACNC: <0.2 AL
ENA SS-A AB SER IA-ACNC: <0.2 AL
ENA SS-B AB SER IA-ACNC: <0.2 AL
EOSINOPHIL # BLD AUTO: 0.1 K/UL
EOSINOPHIL NFR BLD AUTO: 1 %
EPITHELIAL CELLS: 2 /HPF
ERYTHROCYTE [SEDIMENTATION RATE] IN BLOOD BY WESTERGREN METHOD: 70 MM/HR
G6PD SER-CCNC: 19 U/G HGB
GLUCOSE QUALITATIVE U: NEGATIVE MG/DL
GLUCOSE SERPL-MCNC: 150 MG/DL
HCT VFR BLD CALC: 33.4 %
HGB BLD-MCNC: 10.3 G/DL
HLA-B27 QL NAA+PROBE: NORMAL
IMM GRANULOCYTES NFR BLD AUTO: 0.4 %
KETONES URINE: ABNORMAL MG/DL
LEUKOCYTE ESTERASE URINE: ABNORMAL
LYMPHOCYTES # BLD AUTO: 3.14 K/UL
LYMPHOCYTES NFR BLD AUTO: 32.5 %
MAN DIFF?: NORMAL
MCHC RBC-ENTMCNC: 23.7 PG
MCHC RBC-ENTMCNC: 30.8 GM/DL
MCV RBC AUTO: 76.8 FL
MICROSCOPIC-UA: NORMAL
MONOCYTES # BLD AUTO: 0.61 K/UL
MONOCYTES NFR BLD AUTO: 6.3 %
NEUTROPHILS # BLD AUTO: 5.72 K/UL
NEUTROPHILS NFR BLD AUTO: 59.3 %
NITRITE URINE: NEGATIVE
PH URINE: 5.5
PLATELET # BLD AUTO: 508 K/UL
POTASSIUM SERPL-SCNC: 4.5 MMOL/L
PROT SERPL-MCNC: 8.2 G/DL
PROT UR-MCNC: 20 MG/DL
PROTEIN URINE: NORMAL MG/DL
RBC # BLD: 4.35 M/UL
RBC # FLD: 15.6 %
RED BLOOD CELLS URINE: 1 /HPF
RF+CCP IGG SER-IMP: NEGATIVE
RHEUMATOID FACT SER QL: <10 IU/ML
SODIUM SERPL-SCNC: 138 MMOL/L
SPECIFIC GRAVITY URINE: 1.02
UROBILINOGEN URINE: 0.2 MG/DL
WBC # FLD AUTO: 9.66 K/UL
WHITE BLOOD CELLS URINE: 3 /HPF

## 2024-05-09 PROCEDURE — 99442: CPT

## 2024-05-09 RX ORDER — HYDROXYCHLOROQUINE SULFATE 200 MG/1
200 TABLET, FILM COATED ORAL
Qty: 45 | Refills: 1 | Status: ACTIVE | COMMUNITY
Start: 2024-05-09 | End: 1900-01-01

## 2025-02-20 NOTE — ED ADULT NURSE NOTE - NS PRO PASSIVE SMOKE EXP
Patient called 02/20/25 LVM in reference to redoing the pain medication due to the pharmacy being closed     *please review *dvgg   
No